# Patient Record
Sex: MALE | RURAL
[De-identification: names, ages, dates, MRNs, and addresses within clinical notes are randomized per-mention and may not be internally consistent; named-entity substitution may affect disease eponyms.]

---

## 2024-03-29 ENCOUNTER — TRANSCRIBE ORDERS (OUTPATIENT)
Facility: HOSPITAL | Age: 64
End: 2024-03-29

## 2024-03-29 DIAGNOSIS — R31.21 ASYMPTOMATIC MICROSCOPIC HEMATURIA: Primary | ICD-10-CM

## 2024-04-08 ENCOUNTER — HOSPITAL ENCOUNTER (OUTPATIENT)
Facility: HOSPITAL | Age: 64
Discharge: HOME | End: 2024-04-11
Payer: OTHER GOVERNMENT

## 2024-04-08 DIAGNOSIS — R31.21 ASYMPTOMATIC MICROSCOPIC HEMATURIA: ICD-10-CM

## 2024-04-08 PROCEDURE — 74177 CT ABD & PELVIS W/CONTRAST: CPT

## 2024-04-08 PROCEDURE — 6360000004 HC RX CONTRAST MEDICATION: Performed by: UROLOGY

## 2024-04-08 RX ADMIN — IOPAMIDOL 100 ML: 612 INJECTION, SOLUTION INTRAVENOUS at 07:42

## 2024-04-11 ENCOUNTER — TRANSCRIBE ORDERS (OUTPATIENT)
Facility: HOSPITAL | Age: 64
End: 2024-04-11

## 2024-04-11 DIAGNOSIS — Z01.89 DIAGNOSTIC SKIN AND SENSITIZATION TESTS: Primary | ICD-10-CM

## 2024-04-15 ENCOUNTER — HOSPITAL ENCOUNTER (OUTPATIENT)
Facility: HOSPITAL | Age: 64
Discharge: HOME | End: 2024-04-18
Attending: INTERNAL MEDICINE
Payer: OTHER GOVERNMENT

## 2024-04-15 DIAGNOSIS — Z01.89 DIAGNOSTIC SKIN AND SENSITIZATION TESTS: ICD-10-CM

## 2024-04-15 PROCEDURE — 76706 US ABDL AORTA SCREEN AAA: CPT

## 2025-04-14 ENCOUNTER — HOSPITAL ENCOUNTER (INPATIENT)
Facility: HOSPITAL | Age: 65
LOS: 1 days | Discharge: HOME | DRG: 190 | End: 2025-04-15
Attending: INTERNAL MEDICINE | Admitting: INTERNAL MEDICINE
Payer: OTHER GOVERNMENT

## 2025-04-14 ENCOUNTER — APPOINTMENT (OUTPATIENT)
Facility: HOSPITAL | Age: 65
DRG: 190 | End: 2025-04-14
Payer: OTHER GOVERNMENT

## 2025-04-14 DIAGNOSIS — J18.9 PNEUMONIA OF RIGHT LUNG DUE TO INFECTIOUS ORGANISM, UNSPECIFIED PART OF LUNG: Primary | ICD-10-CM

## 2025-04-14 DIAGNOSIS — J44.1 COPD EXACERBATION (HCC): ICD-10-CM

## 2025-04-14 LAB
ALBUMIN SERPL-MCNC: 3.6 G/DL (ref 3.5–5)
ALBUMIN/GLOB SERPL: 0.9 (ref 1.1–2.2)
ALP SERPL-CCNC: 130 U/L (ref 45–117)
ALT SERPL-CCNC: 75 U/L (ref 12–78)
ANION GAP SERPL CALC-SCNC: 7 MMOL/L (ref 2–12)
AST SERPL-CCNC: 35 U/L (ref 15–37)
BASOPHILS # BLD: 0.03 K/UL (ref 0–0.1)
BASOPHILS NFR BLD: 0.2 % (ref 0–1)
BILIRUB SERPL-MCNC: 0.9 MG/DL (ref 0.2–1)
BUN SERPL-MCNC: 17 MG/DL (ref 6–20)
BUN/CREAT SERPL: 19 (ref 12–20)
CALCIUM SERPL-MCNC: 9.3 MG/DL (ref 8.5–10.1)
CHLORIDE SERPL-SCNC: 101 MMOL/L (ref 97–108)
CO2 SERPL-SCNC: 32 MMOL/L (ref 21–32)
CREAT SERPL-MCNC: 0.89 MG/DL (ref 0.7–1.3)
D DIMER PPP FEU-MCNC: 8.26 MG/L FEU (ref 0–0.65)
DIFFERENTIAL METHOD BLD: ABNORMAL
EOSINOPHIL # BLD: 0.58 K/UL (ref 0–0.4)
EOSINOPHIL NFR BLD: 4.8 % (ref 0–7)
ERYTHROCYTE [DISTWIDTH] IN BLOOD BY AUTOMATED COUNT: 13.4 % (ref 11.5–14.5)
FLUAV RNA SPEC QL NAA+PROBE: NOT DETECTED
FLUBV RNA SPEC QL NAA+PROBE: NOT DETECTED
GLOBULIN SER CALC-MCNC: 4 G/DL (ref 2–4)
GLUCOSE SERPL-MCNC: 98 MG/DL (ref 65–100)
HCT VFR BLD AUTO: 41 % (ref 36.6–50.3)
HGB BLD-MCNC: 13.4 G/DL (ref 12.1–17)
IMM GRANULOCYTES # BLD AUTO: 0.05 K/UL (ref 0–0.04)
IMM GRANULOCYTES NFR BLD AUTO: 0.4 % (ref 0–0.5)
LACTATE SERPL-SCNC: 1.3 MMOL/L (ref 0.4–2)
LYMPHOCYTES # BLD: 0.83 K/UL (ref 0.8–3.5)
LYMPHOCYTES NFR BLD: 6.8 % (ref 12–49)
MAGNESIUM SERPL-MCNC: 1 MG/DL (ref 1.6–2.4)
MCH RBC QN AUTO: 33.5 PG (ref 26–34)
MCHC RBC AUTO-ENTMCNC: 32.7 G/DL (ref 30–36.5)
MCV RBC AUTO: 102.5 FL (ref 80–99)
MONOCYTES # BLD: 0.75 K/UL (ref 0–1)
MONOCYTES NFR BLD: 6.2 % (ref 5–13)
NEUTS SEG # BLD: 9.89 K/UL (ref 1.8–8)
NEUTS SEG NFR BLD: 81.6 % (ref 32–75)
NRBC # BLD: 0 K/UL (ref 0–0.01)
NRBC BLD-RTO: 0 PER 100 WBC
NT PRO BNP: 97 PG/ML (ref 0–125)
PLATELET # BLD AUTO: 192 K/UL (ref 150–400)
PMV BLD AUTO: 9.7 FL (ref 8.9–12.9)
POTASSIUM SERPL-SCNC: 3.7 MMOL/L (ref 3.5–5.1)
PROT SERPL-MCNC: 7.6 G/DL (ref 6.4–8.2)
RBC # BLD AUTO: 4 M/UL (ref 4.1–5.7)
SARS-COV-2 RNA RESP QL NAA+PROBE: NOT DETECTED
SODIUM SERPL-SCNC: 140 MMOL/L (ref 136–145)
SOURCE: NORMAL
TROPONIN I SERPL HS-MCNC: 9 NG/L (ref 0–76)
WBC # BLD AUTO: 12.1 K/UL (ref 4.1–11.1)

## 2025-04-14 PROCEDURE — 71046 X-RAY EXAM CHEST 2 VIEWS: CPT

## 2025-04-14 PROCEDURE — 2580000003 HC RX 258

## 2025-04-14 PROCEDURE — 6370000000 HC RX 637 (ALT 250 FOR IP)

## 2025-04-14 PROCEDURE — 83605 ASSAY OF LACTIC ACID: CPT

## 2025-04-14 PROCEDURE — 87040 BLOOD CULTURE FOR BACTERIA: CPT

## 2025-04-14 PROCEDURE — 83880 ASSAY OF NATRIURETIC PEPTIDE: CPT

## 2025-04-14 PROCEDURE — 71275 CT ANGIOGRAPHY CHEST: CPT

## 2025-04-14 PROCEDURE — 6370000000 HC RX 637 (ALT 250 FOR IP): Performed by: INTERNAL MEDICINE

## 2025-04-14 PROCEDURE — 93005 ELECTROCARDIOGRAM TRACING: CPT

## 2025-04-14 PROCEDURE — 2500000003 HC RX 250 WO HCPCS

## 2025-04-14 PROCEDURE — 36415 COLL VENOUS BLD VENIPUNCTURE: CPT

## 2025-04-14 PROCEDURE — 85025 COMPLETE CBC W/AUTO DIFF WBC: CPT

## 2025-04-14 PROCEDURE — 87636 SARSCOV2 & INF A&B AMP PRB: CPT

## 2025-04-14 PROCEDURE — 96365 THER/PROPH/DIAG IV INF INIT: CPT

## 2025-04-14 PROCEDURE — 83735 ASSAY OF MAGNESIUM: CPT

## 2025-04-14 PROCEDURE — 85379 FIBRIN DEGRADATION QUANT: CPT

## 2025-04-14 PROCEDURE — 6360000002 HC RX W HCPCS

## 2025-04-14 PROCEDURE — 80053 COMPREHEN METABOLIC PANEL: CPT

## 2025-04-14 PROCEDURE — 94640 AIRWAY INHALATION TREATMENT: CPT

## 2025-04-14 PROCEDURE — 84484 ASSAY OF TROPONIN QUANT: CPT

## 2025-04-14 PROCEDURE — 96375 TX/PRO/DX INJ NEW DRUG ADDON: CPT

## 2025-04-14 PROCEDURE — 1100000003 HC PRIVATE W/ TELEMETRY

## 2025-04-14 PROCEDURE — 99285 EMERGENCY DEPT VISIT HI MDM: CPT

## 2025-04-14 PROCEDURE — 6360000002 HC RX W HCPCS: Performed by: INTERNAL MEDICINE

## 2025-04-14 PROCEDURE — 6360000004 HC RX CONTRAST MEDICATION

## 2025-04-14 RX ORDER — ENOXAPARIN SODIUM 100 MG/ML
30 INJECTION SUBCUTANEOUS 2 TIMES DAILY
Status: DISCONTINUED | OUTPATIENT
Start: 2025-04-14 | End: 2025-04-15 | Stop reason: HOSPADM

## 2025-04-14 RX ORDER — CHLORTHALIDONE 25 MG/1
12.5 TABLET ORAL DAILY
Status: DISCONTINUED | OUTPATIENT
Start: 2025-04-15 | End: 2025-04-15 | Stop reason: HOSPADM

## 2025-04-14 RX ORDER — EZETIMIBE 10 MG/1
5 TABLET ORAL
COMMUNITY
Start: 2025-03-19

## 2025-04-14 RX ORDER — ALBUTEROL SULFATE 0.83 MG/ML
2.5 SOLUTION RESPIRATORY (INHALATION)
Status: COMPLETED | OUTPATIENT
Start: 2025-04-14 | End: 2025-04-14

## 2025-04-14 RX ORDER — IPRATROPIUM BROMIDE AND ALBUTEROL SULFATE 2.5; .5 MG/3ML; MG/3ML
1 SOLUTION RESPIRATORY (INHALATION) EVERY 6 HOURS PRN
Status: DISCONTINUED | OUTPATIENT
Start: 2025-04-14 | End: 2025-04-15 | Stop reason: HOSPADM

## 2025-04-14 RX ORDER — ONDANSETRON 2 MG/ML
4 INJECTION INTRAMUSCULAR; INTRAVENOUS EVERY 6 HOURS PRN
Status: DISCONTINUED | OUTPATIENT
Start: 2025-04-14 | End: 2025-04-15 | Stop reason: HOSPADM

## 2025-04-14 RX ORDER — MAGNESIUM SULFATE IN WATER 40 MG/ML
2000 INJECTION, SOLUTION INTRAVENOUS ONCE
Status: COMPLETED | OUTPATIENT
Start: 2025-04-14 | End: 2025-04-14

## 2025-04-14 RX ORDER — TAMSULOSIN HYDROCHLORIDE 0.4 MG/1
0.4 CAPSULE ORAL DAILY
Status: DISCONTINUED | OUTPATIENT
Start: 2025-04-14 | End: 2025-04-15 | Stop reason: HOSPADM

## 2025-04-14 RX ORDER — ATORVASTATIN CALCIUM 80 MG/1
80 TABLET, FILM COATED ORAL
COMMUNITY
Start: 2025-01-15

## 2025-04-14 RX ORDER — ALLOPURINOL 300 MG/1
300 TABLET ORAL DAILY
COMMUNITY
Start: 2025-01-15

## 2025-04-14 RX ORDER — CHLORTHALIDONE 25 MG/1
12.5 TABLET ORAL
COMMUNITY
Start: 2025-01-15

## 2025-04-14 RX ORDER — ACETAMINOPHEN 650 MG/1
650 SUPPOSITORY RECTAL EVERY 6 HOURS PRN
Status: DISCONTINUED | OUTPATIENT
Start: 2025-04-14 | End: 2025-04-15 | Stop reason: HOSPADM

## 2025-04-14 RX ORDER — OMEPRAZOLE 40 MG/1
40 CAPSULE, DELAYED RELEASE ORAL
COMMUNITY
Start: 2025-01-15

## 2025-04-14 RX ORDER — ONDANSETRON 4 MG/1
4 TABLET, ORALLY DISINTEGRATING ORAL EVERY 8 HOURS PRN
Status: DISCONTINUED | OUTPATIENT
Start: 2025-04-14 | End: 2025-04-15 | Stop reason: HOSPADM

## 2025-04-14 RX ORDER — IPRATROPIUM BROMIDE AND ALBUTEROL SULFATE 2.5; .5 MG/3ML; MG/3ML
1 SOLUTION RESPIRATORY (INHALATION)
Status: COMPLETED | OUTPATIENT
Start: 2025-04-14 | End: 2025-04-14

## 2025-04-14 RX ORDER — SODIUM CHLORIDE 0.9 % (FLUSH) 0.9 %
5-40 SYRINGE (ML) INJECTION PRN
Status: DISCONTINUED | OUTPATIENT
Start: 2025-04-14 | End: 2025-04-15 | Stop reason: HOSPADM

## 2025-04-14 RX ORDER — CETIRIZINE HYDROCHLORIDE 10 MG/1
10 TABLET ORAL
COMMUNITY
Start: 2025-01-15

## 2025-04-14 RX ORDER — ATORVASTATIN CALCIUM 40 MG/1
80 TABLET, FILM COATED ORAL NIGHTLY
Status: DISCONTINUED | OUTPATIENT
Start: 2025-04-14 | End: 2025-04-15 | Stop reason: HOSPADM

## 2025-04-14 RX ORDER — MELOXICAM 7.5 MG/1
7.5 TABLET ORAL
COMMUNITY
Start: 2025-01-15

## 2025-04-14 RX ORDER — POLYETHYLENE GLYCOL 3350 17 G/17G
17 POWDER, FOR SOLUTION ORAL DAILY PRN
Status: DISCONTINUED | OUTPATIENT
Start: 2025-04-14 | End: 2025-04-15 | Stop reason: HOSPADM

## 2025-04-14 RX ORDER — ALBUTEROL SULFATE 90 UG/1
INHALANT RESPIRATORY (INHALATION)
COMMUNITY
Start: 2025-01-15

## 2025-04-14 RX ORDER — EZETIMIBE 10 MG/1
5 TABLET ORAL NIGHTLY
Status: DISCONTINUED | OUTPATIENT
Start: 2025-04-14 | End: 2025-04-15 | Stop reason: HOSPADM

## 2025-04-14 RX ORDER — BUDESONIDE AND FORMOTEROL FUMARATE DIHYDRATE 160; 4.5 UG/1; UG/1
AEROSOL RESPIRATORY (INHALATION)
COMMUNITY
Start: 2025-02-19

## 2025-04-14 RX ORDER — PREDNISONE 20 MG/1
40 TABLET ORAL DAILY
Status: DISCONTINUED | OUTPATIENT
Start: 2025-04-15 | End: 2025-04-15 | Stop reason: HOSPADM

## 2025-04-14 RX ORDER — GUAIFENESIN 600 MG/1
600 TABLET, EXTENDED RELEASE ORAL 2 TIMES DAILY
Status: DISCONTINUED | OUTPATIENT
Start: 2025-04-14 | End: 2025-04-15 | Stop reason: HOSPADM

## 2025-04-14 RX ORDER — TAMSULOSIN HYDROCHLORIDE 0.4 MG/1
0.4 CAPSULE ORAL
COMMUNITY
Start: 2025-01-15

## 2025-04-14 RX ORDER — SODIUM CHLORIDE 0.9 % (FLUSH) 0.9 %
5-40 SYRINGE (ML) INJECTION EVERY 12 HOURS SCHEDULED
Status: DISCONTINUED | OUTPATIENT
Start: 2025-04-14 | End: 2025-04-15 | Stop reason: HOSPADM

## 2025-04-14 RX ORDER — PANTOPRAZOLE SODIUM 40 MG/1
40 TABLET, DELAYED RELEASE ORAL
Status: DISCONTINUED | OUTPATIENT
Start: 2025-04-15 | End: 2025-04-15 | Stop reason: HOSPADM

## 2025-04-14 RX ORDER — IOPAMIDOL 755 MG/ML
100 INJECTION, SOLUTION INTRAVASCULAR
Status: COMPLETED | OUTPATIENT
Start: 2025-04-14 | End: 2025-04-14

## 2025-04-14 RX ORDER — SODIUM CHLORIDE 9 MG/ML
INJECTION, SOLUTION INTRAVENOUS PRN
Status: DISCONTINUED | OUTPATIENT
Start: 2025-04-14 | End: 2025-04-15 | Stop reason: HOSPADM

## 2025-04-14 RX ORDER — ACETAMINOPHEN 325 MG/1
650 TABLET ORAL EVERY 6 HOURS PRN
Status: DISCONTINUED | OUTPATIENT
Start: 2025-04-14 | End: 2025-04-15 | Stop reason: HOSPADM

## 2025-04-14 RX ADMIN — MAGNESIUM SULFATE HEPTAHYDRATE 2000 MG: 40 INJECTION, SOLUTION INTRAVENOUS at 16:55

## 2025-04-14 RX ADMIN — WATER 125 MG: 1 INJECTION INTRAMUSCULAR; INTRAVENOUS; SUBCUTANEOUS at 15:59

## 2025-04-14 RX ADMIN — ATORVASTATIN CALCIUM 80 MG: 40 TABLET, FILM COATED ORAL at 22:25

## 2025-04-14 RX ADMIN — ALBUTEROL SULFATE 2.5 MG: 2.5 SOLUTION RESPIRATORY (INHALATION) at 17:22

## 2025-04-14 RX ADMIN — AZITHROMYCIN MONOHYDRATE 500 MG: 500 INJECTION, POWDER, LYOPHILIZED, FOR SOLUTION INTRAVENOUS at 18:22

## 2025-04-14 RX ADMIN — EZETIMIBE 5 MG: 10 TABLET ORAL at 22:24

## 2025-04-14 RX ADMIN — GUAIFENESIN 600 MG: 600 TABLET ORAL at 22:25

## 2025-04-14 RX ADMIN — WATER 1000 MG: 1 INJECTION INTRAMUSCULAR; INTRAVENOUS; SUBCUTANEOUS at 18:10

## 2025-04-14 RX ADMIN — IOPAMIDOL 100 ML: 755 INJECTION, SOLUTION INTRAVENOUS at 17:18

## 2025-04-14 RX ADMIN — ENOXAPARIN SODIUM 30 MG: 100 INJECTION SUBCUTANEOUS at 22:25

## 2025-04-14 RX ADMIN — IPRATROPIUM BROMIDE AND ALBUTEROL SULFATE 1 DOSE: .5; 2.5 SOLUTION RESPIRATORY (INHALATION) at 15:52

## 2025-04-14 ASSESSMENT — LIFESTYLE VARIABLES
HOW MANY STANDARD DRINKS CONTAINING ALCOHOL DO YOU HAVE ON A TYPICAL DAY: PATIENT DOES NOT DRINK
HOW OFTEN DO YOU HAVE A DRINK CONTAINING ALCOHOL: NEVER

## 2025-04-14 ASSESSMENT — PAIN SCALES - GENERAL
PAINLEVEL_OUTOF10: 0
PAINLEVEL_OUTOF10: 0

## 2025-04-14 ASSESSMENT — PAIN - FUNCTIONAL ASSESSMENT
PAIN_FUNCTIONAL_ASSESSMENT: NONE - DENIES PAIN
PAIN_FUNCTIONAL_ASSESSMENT: 0-10

## 2025-04-14 NOTE — ED PROVIDER NOTES
[facility] EMERGENCY DEPARTMENT  EMERGENCY DEPARTMENT ENCOUNTER       Pt Name: Pierce Bee  MRN: [id number]  Birthdate 1960  Date of evaluation: 4/14/2025  Provider: Chelsy Prado PA-C   PCP: None, None  Note Started: 3:51 PM EDT 4/14/25     CHIEF COMPLAINT       Chief Complaint   Patient presents with    Respiratory Distress        HISTORY OF PRESENT ILLNESS: 1 or more elements      History From: Patient  HPI Limitations: None     Pierce Bee is a 65 y.o. male who presents with cough shortness of breath times few days, worse today.  Patient does note that 2 days ago he did feel like he had a slight cold, reports that he had some nasal congestion, body aches, chills reports that he has had a cough with yellowish-greenish phlegm.  He denies hemoptysis.  He denies chest pain.  He denies arm pain, jaw pain, lightness, dizziness, syncope.  He reports he does have COPD, he reports that he has been using his home inhalers, and nebulizers with some minimal relief.  He notes that he has needed to be admitted to the hospital 1 time before due to his COPD exacerbation, reports that this was approximately 2 years ago.     Nursing Notes were all reviewed and agreed with or any disagreements were addressed in the HPI.     REVIEW OF SYSTEMS      Review of Systems     Positives and Pertinent negatives as per HPI.    PAST HISTORY     Past Medical History:  History reviewed. No pertinent past medical history.    Past Surgical History:  History reviewed. No pertinent surgical history.    Family History:  History reviewed. No pertinent family history.    Social History:       Allergies:  Allergies   Allergen Reactions    Fluticasone-Salmeterol Cough     Other Reaction(s): Throat irritation, Cough       CURRENT MEDICATIONS      Previous Medications    ALBUTEROL SULFATE HFA (PROVENTIL;VENTOLIN;PROAIR) 108 (90 BASE) MCG/ACT INHALER    Inhale into the lungs    ALLOPURINOL (ZYLOPRIM) 300 MG TABLET    Take 1 tablet by

## 2025-04-14 NOTE — H&P
Hospitalist Admission Note    NAME:   Pierce Bee   : 1960   MRN: [id number]     Date/Time: 2025 6:38 PM    Patient PCP: None, None    ______________________________________________________________________  Given the patient's current clinical presentation, I have a high level of concern for decompensation if discharged from the emergency department.  Complex decision making was performed, which includes reviewing the patient's available past medical records, laboratory results, and x-ray films.       My assessment of this patient's clinical condition and my plan of care is as follows.    Assessment / Plan:    Acute COPD exacerbation  Somewhat improved with 1 dose of Solu-Medrol and nebulizer treatments.  Continue on prednisone and nebulizer treatments during hospital stay.    Right upper lobe and lower lobe pneumonia  Continue on IV Rocephin and azithromycin for the next 48 to 72 hours.  Continue treatment for COPD.    Hypomagnesemia  Continue with IV supplementation.    Hypertension  Continue chlorthalidone.    BPH  Continue Flomax.    GERD  Continue home medication.    DVT prophylaxis  Lovenox SC      Medical Decision Making:   I personally reviewed labs: CMP, CBC  I personally reviewed imaging: CT angiogram chest, chest x-ray  I personally reviewed EKG:  Toxic drug monitoring:     Discussed case with: ED provider. After discussion I am in agreement that acuity of patient's medical condition necessitates hospital stay.      Subjective:   CHIEF COMPLAINT: Shortness of breath and chills    HISTORY OF PRESENT ILLNESS:     Pierce Bee is a 65 y.o.  male with PMHx significant for COPD, hypertension coming in with 3 to 4-day history of progressive wheezing, shortness of breath and chills.    Patient is alert and oriented x 4 and evaluated in the ED with nurse and wife at bedside.  Patient feels significantly improved after IV Solu-Medrol, IV mag infusion as well as antibiotics along with nebulizer

## 2025-04-14 NOTE — ED NOTES
Admission SBAR Note  Situation/Background: Patient presented to the ED for shortness of breath. Patient is alert & oriented, follows commands, and is pleasant. Patient had duo-nebs, solumedrol, rocephin, and azithromycin. Patient required oxygen @ 2lpm via nasal canula, however is remaining at 91-94% on R.A.     Patient is being transferred to [facility] ([facility]), Room# 122    Patient's Chief Complaint was shortness of breath and is admitted for COPD exacerbation and pneumonia.    CODE STATUS: Full  CSSRS: 0 - No Risk    ISOLATION/PRECAUTIONS: No  ISOLATION TYPE: n/a    Is this a behavioral health patient? No  Has wanding been completed No  Are belongings secure? No    Called outstanding consults: Yes    STAT labs collected: Yes    Repeat Lactic Acid DUE? No  TIME DUE: n/a    All STAT orders are complete: Yes    The following personal items will be sent with the patient during transfer to the floor:     All valuables: listed in ER with patient,       ASSESSMENT:    NEURO:   NIH SCORE: 0,1-4,5-15,15-20,21-42: 0   KEILA SWALLOW SCREEN COMPLETE: No  ORIENTATION LEVEL: ORIENTATION LEVEL: Person, Place, Time, and Situation  Cognition:  appropriate decision making, appropriate for age attention/concentration, appropriate safety awareness, and following commands  follows multi-step complex commands/direction  Speech: shows no evidence of impairment    Is patient impulsive? No  Is patient oriented? Yes  Do they follow commands? Yes  Is the patient ambulatory? Yes    FALL RISK? Yes  Interventions    RESPIRATORY:   Is patient on oxygen? No  Oxygen therapy: room air  O2 rate: n/a    CARDIAC:   Is cardiac monitoring ordered? Yes    Last Rhythm: Rhythm including paccardio: Normal Sinus Rhythm @89  Patient to transfer with tele box on? Yes  Infusions: Meds; iv fluids: none  LINE ACCESS: 20G Peripheral IV , Antecubital , Iv rate: heplock       /GI:   Continent

## 2025-04-15 VITALS
RESPIRATION RATE: 18 BRPM | HEIGHT: 70 IN | HEART RATE: 90 BPM | BODY MASS INDEX: 32.69 KG/M2 | SYSTOLIC BLOOD PRESSURE: 134 MMHG | TEMPERATURE: 97.7 F | OXYGEN SATURATION: 92 % | WEIGHT: 228.3 LBS | DIASTOLIC BLOOD PRESSURE: 74 MMHG

## 2025-04-15 PROCEDURE — 6370000000 HC RX 637 (ALT 250 FOR IP): Performed by: INTERNAL MEDICINE

## 2025-04-15 PROCEDURE — 94761 N-INVAS EAR/PLS OXIMETRY MLT: CPT

## 2025-04-15 PROCEDURE — 94640 AIRWAY INHALATION TREATMENT: CPT

## 2025-04-15 PROCEDURE — 6360000002 HC RX W HCPCS: Performed by: INTERNAL MEDICINE

## 2025-04-15 PROCEDURE — 2500000003 HC RX 250 WO HCPCS: Performed by: INTERNAL MEDICINE

## 2025-04-15 RX ORDER — METHYLPREDNISOLONE 4 MG/1
TABLET ORAL
Qty: 1 KIT | Refills: 1 | Status: SHIPPED | OUTPATIENT
Start: 2025-04-15

## 2025-04-15 RX ORDER — AZITHROMYCIN 250 MG/1
500 TABLET, FILM COATED ORAL DAILY
Status: DISCONTINUED | OUTPATIENT
Start: 2025-04-15 | End: 2025-04-15 | Stop reason: HOSPADM

## 2025-04-15 RX ORDER — IPRATROPIUM BROMIDE AND ALBUTEROL SULFATE 2.5; .5 MG/3ML; MG/3ML
1 SOLUTION RESPIRATORY (INHALATION) ONCE
Status: COMPLETED | OUTPATIENT
Start: 2025-04-15 | End: 2025-04-15

## 2025-04-15 RX ORDER — AZITHROMYCIN 250 MG/1
TABLET, FILM COATED ORAL
Qty: 6 TABLET | Refills: 0 | Status: SHIPPED | OUTPATIENT
Start: 2025-04-15 | End: 2025-04-25

## 2025-04-15 RX ADMIN — SODIUM CHLORIDE, PRESERVATIVE FREE 5 ML: 5 INJECTION INTRAVENOUS at 03:17

## 2025-04-15 RX ADMIN — AZITHROMYCIN DIHYDRATE 500 MG: 250 TABLET ORAL at 09:30

## 2025-04-15 RX ADMIN — IPRATROPIUM BROMIDE AND ALBUTEROL SULFATE 1 DOSE: .5; 2.5 SOLUTION RESPIRATORY (INHALATION) at 11:07

## 2025-04-15 RX ADMIN — PANTOPRAZOLE SODIUM 40 MG: 40 TABLET, DELAYED RELEASE ORAL at 05:37

## 2025-04-15 RX ADMIN — PREDNISONE 40 MG: 20 TABLET ORAL at 09:30

## 2025-04-15 RX ADMIN — IPRATROPIUM BROMIDE AND ALBUTEROL SULFATE 1 DOSE: .5; 2.5 SOLUTION RESPIRATORY (INHALATION) at 07:11

## 2025-04-15 RX ADMIN — CHLORTHALIDONE 12.5 MG: 25 TABLET ORAL at 09:30

## 2025-04-15 RX ADMIN — TAMSULOSIN HYDROCHLORIDE 0.4 MG: 0.4 CAPSULE ORAL at 09:30

## 2025-04-15 RX ADMIN — ENOXAPARIN SODIUM 30 MG: 100 INJECTION SUBCUTANEOUS at 09:30

## 2025-04-15 RX ADMIN — SODIUM CHLORIDE, PRESERVATIVE FREE 10 ML: 5 INJECTION INTRAVENOUS at 09:30

## 2025-04-15 RX ADMIN — GUAIFENESIN 600 MG: 600 TABLET ORAL at 09:30

## 2025-04-15 NOTE — PLAN OF CARE
Problem: Respiratory - Adult  Goal: Achieves optimal ventilation and oxygenation  Description: Maintain airway and oxygenation above 92%  4/15/2025 1207 by Crystal Bocanegra RN  Outcome: Progressing  4/15/2025 0714 by Magi Lopez, RT  Outcome: Progressing  4/15/2025 0150 by Lynda William LPN  Outcome: Progressing     Problem: Chronic Conditions and Co-morbidities  Goal: Patient's chronic conditions and co-morbidity symptoms are monitored and maintained or improved  Outcome: Progressing     Problem: Safety - Adult  Goal: Free from fall injury  Outcome: Progressing

## 2025-04-15 NOTE — PLAN OF CARE
Problem: Respiratory - Adult  Goal: Achieves optimal ventilation and oxygenation  Description: Maintain airway and oxygenation above 92%  4/15/2025 0714 by Magi Lopez, RT  Outcome: Progressing  4/15/2025 0150 by Lynda William LPN  Outcome: Progressing

## 2025-04-15 NOTE — PROGRESS NOTES
Discharge Summary    Pierce Bee  :  1960  MRN:  [id number]    ADMIT DATE:  2025  DISCHARGE DATE:  4/15/2025      Discharge instruction reviewed with patient    Home med's returned No. No meds to return.     Personal belongings returned Yes    Patient Walked to front entrance with Nurse        SIGNED:    Crystal Bocanegra RN       
Patient had a 3 beat run of vtach notified Dr Bryan and Crystal Bocanegra RN  
I have personally seen and examined the patient. I have collaborated with and supervised the

## 2025-04-15 NOTE — PLAN OF CARE
Problem: Respiratory - Adult  Goal: Achieves optimal ventilation and oxygenation  Description: Maintain airway and oxygenation above 92%  Outcome: Progressing

## 2025-04-15 NOTE — PLAN OF CARE
Problem: Respiratory - Adult  Goal: Achieves optimal ventilation and oxygenation  Description: Maintain airway and oxygenation above 92%  4/15/2025 1208 by Crystal Bocanegra RN  Outcome: Adequate for Discharge  4/15/2025 1207 by rCystal Bocanegra RN  Outcome: Progressing  4/15/2025 0714 by Magi Lopez RT  Outcome: Progressing  4/15/2025 0150 by Lynda William LPN  Outcome: Progressing     Problem: Chronic Conditions and Co-morbidities  Goal: Patient's chronic conditions and co-morbidity symptoms are monitored and maintained or improved  4/15/2025 1208 by Crystal Bocanegra RN  Outcome: Adequate for Discharge  4/15/2025 1208 by Crystal Bocanegra RN  Outcome: Progressing     Problem: Safety - Adult  Goal: Free from fall injury  4/15/2025 1208 by Crystal Bocanegra RN  Outcome: Adequate for Discharge  4/15/2025 1208 by Crystal Bocanegra RN  Outcome: Progressing

## 2025-04-15 NOTE — ACP (ADVANCE CARE PLANNING)
Spoke with patient and wife at the bedside and both confirmed that patient does not have Advanced Care Planning documents at this time. Patient states that his wife Deanna Bee is his primary decision maker, contact # [phone].

## 2025-04-16 LAB
EKG ATRIAL RATE: 117 BPM
EKG DIAGNOSIS: NORMAL
EKG P AXIS: 73 DEGREES
EKG P-R INTERVAL: 166 MS
EKG Q-T INTERVAL: 330 MS
EKG QRS DURATION: 86 MS
EKG QTC CALCULATION (BAZETT): 460 MS
EKG R AXIS: 92 DEGREES
EKG T AXIS: 51 DEGREES
EKG VENTRICULAR RATE: 117 BPM

## 2025-04-18 NOTE — DISCHARGE SUMMARY
Discharge Summary    Name: Pierce Bee  [id number]  YOB: 1960 (Age: 65 y.o.)   Date of Admission: 4/14/2025  Date of Discharge: 4/18/2025  Attending Physician: No att. providers found    Discharge Diagnosis:   Acute COPD exacerbation  Right upper and lower lobe pneumonia  Hypomagnesemia  Hypertension  GERD  BPH    Consultations:  IP CONSULT TO HOSPITALIST      Brief Admission History/Reason for Admission   Pierce Bee is a 65 y.o.  male with PMHx significant for COPD and hypertension who presented to the ED with a medical history significant for  progressive wheezing, shortness of breath and chills for several days duration.  Denies any chest pain, nausea/vomiting, abdominal pain, diarrhea at home.     In the ED, patient is normotensive with low-grade temperature of 99.1 requiring 2 L oxygen only for comfort with 92% on room air and mildly tachycardic with heart rate in the low 100s.  On chest x-ray there is no acute process however CT angiogram chest secondary to elevated D-dimer reveals right upper and lower lobe possible infiltrate without any pulmonary emboli noted.  He was empirically placed on IV antibiotics and was admitted to the Hospitalist service for further management.     Brief Hospital Course by Main Problems:   On the acute medicine floor patient had significant improvement with continued nebulizer therapy, antibiotics and duonebs.  He was transitioned to oral abx and oral steroids and at time of discharge he was medically stable.     Discharge Exam:  Patient seen and examined by me on discharge day.  Pertinent Findings:  No data found.    Gen:    Not in distress  Chest: Decreased inspiratory effort with few expiratory wheezing  CVS:   Regular rhythm.  No edema  Abd:  Soft, not distended, not tender  Neuro: No gross neurological deficits    Discharge/Recent Laboratory Results:  No results for input(s): \"NA\", \"K\", \"CL\", \"CO2\", \"BUN\", \"CREATININE\",

## 2025-04-20 LAB
BACTERIA SPEC CULT: NORMAL
BACTERIA SPEC CULT: NORMAL
SERVICE CMNT-IMP: NORMAL
SERVICE CMNT-IMP: NORMAL

## 2025-07-16 ENCOUNTER — TRANSCRIBE ORDERS (OUTPATIENT)
Facility: HOSPITAL | Age: 65
End: 2025-07-16

## 2025-07-16 DIAGNOSIS — M54.32 SCIATICA OF LEFT SIDE: Primary | ICD-10-CM
